# Patient Record
Sex: MALE | Race: WHITE
[De-identification: names, ages, dates, MRNs, and addresses within clinical notes are randomized per-mention and may not be internally consistent; named-entity substitution may affect disease eponyms.]

---

## 2019-08-04 ENCOUNTER — HOSPITAL ENCOUNTER (EMERGENCY)
Dept: HOSPITAL 10 - FTE | Age: 39
Discharge: HOME | End: 2019-08-04
Payer: COMMERCIAL

## 2019-08-04 ENCOUNTER — HOSPITAL ENCOUNTER (EMERGENCY)
Dept: HOSPITAL 91 - FTE | Age: 39
Discharge: HOME | End: 2019-08-04
Payer: COMMERCIAL

## 2019-08-04 VITALS
HEIGHT: 68 IN | BODY MASS INDEX: 25.13 KG/M2 | BODY MASS INDEX: 25.13 KG/M2 | HEIGHT: 68 IN | WEIGHT: 165.79 LBS | WEIGHT: 165.79 LBS

## 2019-08-04 VITALS — DIASTOLIC BLOOD PRESSURE: 69 MMHG | RESPIRATION RATE: 18 BRPM | HEART RATE: 64 BPM | SYSTOLIC BLOOD PRESSURE: 109 MMHG

## 2019-08-04 DIAGNOSIS — R21: Primary | ICD-10-CM

## 2019-08-04 PROCEDURE — 99283 EMERGENCY DEPT VISIT LOW MDM: CPT

## 2019-08-04 NOTE — ERD
ER Documentation


Chief Complaint


Chief Complaint





RIGHT CALF RASH, ITCHING AND BURNING X3DAYS





HPI


39-year-old male presents with a rash on his right calf for last 3 days.  It is 


burning and itching.  Denies any exposures, fevers, vomiting, shortness of the 


chest pain or additional symptoms.





ROS


All systems reviewed and are negative except as per history of present illness.





Medications


Home Meds


Active Scripts


Triamcinolone Acetonide (Triamcinolone Acetonide) 0.1% - 15 Gm Cream.gm., 1 


APPLIC TOP BID for 7 Days, #1 TUB


   30g


   Prov:KAT HAJI MD         8/4/19


Acyclovir* (Zovirax*) 800 Mg Tablet, 800 MG PO 5 TIMES DAILY for 7 Days, TAB


   Prov:KAT HAJI MD         8/4/19


Doxycycline Monohydrate* (Doxycycline Monohydrate*) 100 Mg Tablet, 100 MG PO BID


for 10 Days, TAB


   Prov:KAT HAJI MD         8/4/19





PMhx/Soc


Medical and Surgical Hx:  pt denies Medical Hx, pt denies Surgical Hx


Hx Alcohol Use:  Yes


Hx Substance Use:  No


Hx Tobacco Use:  No


Smoking Status:  Never smoker





FmHx


Family History:  No diabetes, No coronary disease, No other





Physical Exam


Vitals





Vital Signs


  Date      Temp  Pulse  Resp  B/P (MAP)   Pulse Ox  O2          O2 Flow    FiO2


Time                                                 Delivery    Rate


    8/4/19  98.0     66    18      119/60        96


     18:43                           (79)





Physical Exam


Const:   No acute distress


Head:   Atraumatic 


Eyes:    Normal Conjunctiva


ENT:    Normal External Ears, Nose and Mouth.


Neck:               Full range of motion. No meningismus.


Resp:   Clear to auscultation bilaterally


Cardio:   Regular rate and rhythm, no murmurs


Abd:    Soft, non tender, non distended. Normal bowel sounds


Skin:   No petechiae or purpura.  Erythematous vesicular rash with pustules on 


the right medial calf.  The area is approximately 8 x 10 cm.  No induration or 


streaking or fluctuance.


Back:   No midline or flank tenderness


Ext:    No cyanosis, or edema


Neur:   Awake and alert


Psych:    Normal Mood and Affect





Procedures/MDM


Patient presents with a painful rash on the right medial calf.  Clinically the 


history is insistent with herpes zoster although there are some white pustules 


suggesting possibly folliculitis as well.  We will treat empirically with 


acyclovir, doxycycline, triamcinolone, primary care follow-up and return 


precautions for this no signs of purpura, lethargy rashes, DVT, significant 


cellulitis or necrotizing fasciitis.  He will be discharged home with return 


precautions, for worsening redness, fevers, new worsening symptoms otherwise 


take acyclovir, doxycycline as prescribed.  The patient was stable with no new 


complaints during the ER course. Clinically, there is no current evidence to 


suggest meningitis, sepsis, acute abdomen, pneumonia, stroke,  acute coronary 


syndrome, pulmonary embolism, aortic dissection or any other emergent condition 


appearing to require further evaluation or hospitalization.  Patient counseled 


regarding my diagnostic impression and care plan. Prior to discharge all 


questions answered. Pt agrees with treatment plan and understands strict return 


precautions. Pt is instructed to follow up with primary care provider within 24-


48 hours. Precautionary instructions provided including instructions to return 


to the ER if not improving or for any worsening or changing symptoms or 


concerns.





Disclaimer: Inadvertent spelling and grammatical errors are likely due to 


EHR/dictation software use and do not reflect on the overall quality of patient 


care. Also, please note that the electronic time recorded on this note does not 


necessarily reflect the actual time of the patient encounter.





Departure


Diagnosis:  


   Primary Impression:  


   Rash


Condition:  Stable


Patient Instructions:  Shingles (Herpes Zoster), Folliculitis





Additional Instructions:  


We will treat for folliculitis as well as possible shingles.  Recheck for 


worsening redness, fevers, new or worsening symptoms.











KAT HAJI MD              Aug 4, 2019 19:24